# Patient Record
Sex: MALE | Race: WHITE | URBAN - METROPOLITAN AREA
[De-identification: names, ages, dates, MRNs, and addresses within clinical notes are randomized per-mention and may not be internally consistent; named-entity substitution may affect disease eponyms.]

---

## 2021-10-14 ENCOUNTER — DOCUMENTATION ONLY (OUTPATIENT)
Dept: OPHTHALMOLOGY | Facility: CLINIC | Age: 66
End: 2021-10-14

## 2022-11-05 ENCOUNTER — DOCUMENTATION ONLY (OUTPATIENT)
Dept: RESPIRATORY THERAPY | Facility: CLINIC | Age: 67
End: 2022-11-05

## 2022-11-05 NOTE — PROGRESS NOTES
TREATMENT PLAN AND GOALS:  PATIENT INSTRUCTED ON USE AND CARE OF THE PAP EQUIPMENT IN ACCORDANCE WITH THE [facility] PRACTICE GUIDELINES.  MACHINE AND MODE:,   PRESSURE SETTING OF: 7-20 CMH20  SN: [id number]  DN: [id number]  ENROLL IN STM: Y OR N? N  REPLACEMENT: Y OR N? 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 WE DISCUSSED HOW TO TURN THE MACHINNE ON/OFF, HOW TO CONNECT THE TUBING AND MASK, AND HOW TO SCROLL THROUGH THE MENU TO SEE HIS SLEEP REPORT OR CHANGE THE COMFORT SETTINGS. I WENT OVER CARE AND CLEANING OF THE DEVICE AND SUPPLIES AND THE SUPPLY SCHEDULE. HE ASKED ABOUT THE SLOT FOR THE SD CARD AND I EXPLAINED THAT THE DEVICE USES A MODEM TO RETRIEVE THE INFORMATION FROM THE CPAP, BUT WE CAN DO A MANUAL DOWNLOAD WITH THE SD CARD IF NECESSARY.PT ASKED ABOUT THE DIFFERENCE BETWEEN NASAL AND FULL FACE (SEALING) AND I SAID SOME PEOPLE THAT USE A NASAL MASK END UP OPENING THEIR OUTH WHEN THEY SLEEP SO THEY AREN'T GETTING THE PRESSURES. HE DECIDED TO START WITH A NASSALMASK. I FIT HIM FOR IT, SHOWED HIM HOW TO ADJUST IT AND CONNECT/DISCONNECT TO THE TUBING. I TOLD HIM TO CALL IN A MONTH FOR SUPPLIES, AND TO CALL ANYTIME IF HE HAD QUESTIONS OR CONCERNS.

## 2023-01-05 ENCOUNTER — DOCUMENTATION ONLY (OUTPATIENT)
Dept: OPHTHALMOLOGY | Facility: CLINIC | Age: 68
End: 2023-01-05

## 2023-07-13 ENCOUNTER — DOCUMENTATION ONLY (OUTPATIENT)
Dept: RESPIRATORY THERAPY | Facility: CLINIC | Age: 68
End: 2023-07-13
Payer: COMMERCIAL

## 2023-07-13 NOTE — PROGRESS NOTES
TREATMENT PLAN AND GOALS:  PATIENT INSTRUCTED ON USE AND CARE OF THE PAP EQUIPMENT IN ACCORDANCE WITH THE [facility] PRACTICE GUIDELINES.  MACHINE MODEL AND MODE:  REG AIRCURVE  PRESSURE SETTING OF: PS 7, EPAP 6 IPAP 18      DATE PATIENT WAS SETUP ON: 07/10/2023  [facility]  SETUP BY: ANTONELLA ANDRADE MET ME AT THE [facility] SHOWROOM. I DID HIS INITIAL CPAP SETUP, BUT  HAS ORDERED A BIPAP, CPAP DIDN'T WORK. HE HAS THE AIR11, SO THE AIRCURVE IS A LITTLE DIFFERENT. WE WENT OVER THE WATER CHAMBER, CONNECTING THE TUBING AND MASK, POWER CORD, START/STOP. WE DID A MASK FIT. WE DISCUSSED COMPLIANCE AND HE SAID HE WAS GOING TO BE DOING SOME TRAVELING BUT I EXPLAINED HE ACTUALLY HAS 90 DAYS TO MEET THE 21/30 DAY COMPLIANCE (70%). HE SAID HE MAY DECIDE TO JUST BRING IT ALONG WITH HIM. PT HAD NO MORE QUESTIONS.

## 2023-08-18 ENCOUNTER — TRANSFERRED RECORDS (OUTPATIENT)
Dept: HEALTH INFORMATION MANAGEMENT | Facility: CLINIC | Age: 68
End: 2023-08-18
Payer: COMMERCIAL

## 2024-07-30 ENCOUNTER — TRANSFERRED RECORDS (OUTPATIENT)
Dept: HEALTH INFORMATION MANAGEMENT | Facility: CLINIC | Age: 69
End: 2024-07-30
Payer: COMMERCIAL